# Patient Record
Sex: MALE | Race: WHITE | NOT HISPANIC OR LATINO | Employment: OTHER | URBAN - METROPOLITAN AREA
[De-identification: names, ages, dates, MRNs, and addresses within clinical notes are randomized per-mention and may not be internally consistent; named-entity substitution may affect disease eponyms.]

---

## 2020-06-30 ENCOUNTER — HOSPITAL ENCOUNTER (EMERGENCY)
Facility: HOSPITAL | Age: 55
Discharge: LEFT BEFORE DISCHARGE | End: 2020-06-30
Attending: EMERGENCY MEDICINE
Payer: MEDICARE

## 2020-06-30 VITALS
OXYGEN SATURATION: 97 % | DIASTOLIC BLOOD PRESSURE: 63 MMHG | TEMPERATURE: 98 F | SYSTOLIC BLOOD PRESSURE: 135 MMHG | BODY MASS INDEX: 22.45 KG/M2 | HEART RATE: 99 BPM | RESPIRATION RATE: 18 BRPM | HEIGHT: 67 IN | WEIGHT: 143.06 LBS

## 2020-06-30 DIAGNOSIS — R07.9 CHEST PAIN: ICD-10-CM

## 2020-06-30 DIAGNOSIS — E87.5 HYPERKALEMIA: ICD-10-CM

## 2020-06-30 DIAGNOSIS — M62.82 NON-TRAUMATIC RHABDOMYOLYSIS: ICD-10-CM

## 2020-06-30 DIAGNOSIS — N17.9 AKI (ACUTE KIDNEY INJURY): Primary | ICD-10-CM

## 2020-06-30 DIAGNOSIS — R53.83 FATIGUE: ICD-10-CM

## 2020-06-30 DIAGNOSIS — E86.0 DEHYDRATION: ICD-10-CM

## 2020-06-30 LAB
ALBUMIN SERPL BCP-MCNC: 4.7 G/DL (ref 3.5–5.2)
ALP SERPL-CCNC: 89 U/L (ref 55–135)
ALT SERPL W/O P-5'-P-CCNC: 91 U/L (ref 10–44)
ANION GAP SERPL CALC-SCNC: 17 MMOL/L (ref 8–16)
AST SERPL-CCNC: 57 U/L (ref 10–40)
BASOPHILS # BLD AUTO: 0.05 K/UL (ref 0–0.2)
BASOPHILS NFR BLD: 0.4 % (ref 0–1.9)
BILIRUB SERPL-MCNC: 0.5 MG/DL (ref 0.1–1)
BNP SERPL-MCNC: 16 PG/ML (ref 0–99)
BUN SERPL-MCNC: 28 MG/DL (ref 6–20)
CALCIUM SERPL-MCNC: 10.1 MG/DL (ref 8.7–10.5)
CHLORIDE SERPL-SCNC: 100 MMOL/L (ref 95–110)
CK SERPL-CCNC: 508 U/L (ref 20–200)
CO2 SERPL-SCNC: 20 MMOL/L (ref 23–29)
CREAT SERPL-MCNC: 3.3 MG/DL (ref 0.5–1.4)
DIFFERENTIAL METHOD: ABNORMAL
EOSINOPHIL # BLD AUTO: 0 K/UL (ref 0–0.5)
EOSINOPHIL NFR BLD: 0 % (ref 0–8)
ERYTHROCYTE [DISTWIDTH] IN BLOOD BY AUTOMATED COUNT: 12.5 % (ref 11.5–14.5)
EST. GFR  (AFRICAN AMERICAN): 23 ML/MIN/1.73 M^2
EST. GFR  (NON AFRICAN AMERICAN): 20 ML/MIN/1.73 M^2
ETHANOL SERPL-MCNC: <10 MG/DL
GLUCOSE SERPL-MCNC: 103 MG/DL (ref 70–110)
HCT VFR BLD AUTO: 43.8 % (ref 40–54)
HCV AB SERPL QL IA: POSITIVE
HGB BLD-MCNC: 14.9 G/DL (ref 14–18)
HIV 1+2 AB+HIV1 P24 AG SERPL QL IA: NEGATIVE
IMM GRANULOCYTES # BLD AUTO: 0.08 K/UL (ref 0–0.04)
IMM GRANULOCYTES NFR BLD AUTO: 0.6 % (ref 0–0.5)
LIPASE SERPL-CCNC: 22 U/L (ref 4–60)
LYMPHOCYTES # BLD AUTO: 2.5 K/UL (ref 1–4.8)
LYMPHOCYTES NFR BLD: 18.4 % (ref 18–48)
MCH RBC QN AUTO: 32.4 PG (ref 27–31)
MCHC RBC AUTO-ENTMCNC: 34 G/DL (ref 32–36)
MCV RBC AUTO: 95 FL (ref 82–98)
MONOCYTES # BLD AUTO: 1.3 K/UL (ref 0.3–1)
MONOCYTES NFR BLD: 9.6 % (ref 4–15)
NEUTROPHILS # BLD AUTO: 9.6 K/UL (ref 1.8–7.7)
NEUTROPHILS NFR BLD: 71 % (ref 38–73)
NRBC BLD-RTO: 0 /100 WBC
PLATELET # BLD AUTO: 357 K/UL (ref 150–350)
PMV BLD AUTO: 9.8 FL (ref 9.2–12.9)
POTASSIUM SERPL-SCNC: 6.1 MMOL/L (ref 3.5–5.1)
PROT SERPL-MCNC: 8 G/DL (ref 6–8.4)
RBC # BLD AUTO: 4.6 M/UL (ref 4.6–6.2)
SODIUM SERPL-SCNC: 137 MMOL/L (ref 136–145)
TROPONIN I SERPL DL<=0.01 NG/ML-MCNC: 0.01 NG/ML (ref 0–0.03)
WBC # BLD AUTO: 13.48 K/UL (ref 3.9–12.7)

## 2020-06-30 PROCEDURE — 83690 ASSAY OF LIPASE: CPT

## 2020-06-30 PROCEDURE — 86703 HIV-1/HIV-2 1 RESULT ANTBDY: CPT

## 2020-06-30 PROCEDURE — 86803 HEPATITIS C AB TEST: CPT

## 2020-06-30 PROCEDURE — 96374 THER/PROPH/DIAG INJ IV PUSH: CPT

## 2020-06-30 PROCEDURE — 82550 ASSAY OF CK (CPK): CPT

## 2020-06-30 PROCEDURE — 96361 HYDRATE IV INFUSION ADD-ON: CPT

## 2020-06-30 PROCEDURE — 93010 EKG 12-LEAD: ICD-10-PCS | Mod: ,,, | Performed by: INTERNAL MEDICINE

## 2020-06-30 PROCEDURE — 83880 ASSAY OF NATRIURETIC PEPTIDE: CPT

## 2020-06-30 PROCEDURE — 36415 COLL VENOUS BLD VENIPUNCTURE: CPT

## 2020-06-30 PROCEDURE — 25000003 PHARM REV CODE 250: Performed by: EMERGENCY MEDICINE

## 2020-06-30 PROCEDURE — 63600175 PHARM REV CODE 636 W HCPCS: Performed by: EMERGENCY MEDICINE

## 2020-06-30 PROCEDURE — 99291 CRITICAL CARE FIRST HOUR: CPT | Mod: 25

## 2020-06-30 PROCEDURE — 80320 DRUG SCREEN QUANTALCOHOLS: CPT

## 2020-06-30 PROCEDURE — 84484 ASSAY OF TROPONIN QUANT: CPT

## 2020-06-30 PROCEDURE — 93005 ELECTROCARDIOGRAM TRACING: CPT

## 2020-06-30 PROCEDURE — 80053 COMPREHEN METABOLIC PANEL: CPT

## 2020-06-30 PROCEDURE — 85025 COMPLETE CBC W/AUTO DIFF WBC: CPT

## 2020-06-30 PROCEDURE — 93010 ELECTROCARDIOGRAM REPORT: CPT | Mod: ,,, | Performed by: INTERNAL MEDICINE

## 2020-06-30 RX ORDER — ONDANSETRON 2 MG/ML
4 INJECTION INTRAMUSCULAR; INTRAVENOUS
Status: COMPLETED | OUTPATIENT
Start: 2020-06-30 | End: 2020-06-30

## 2020-06-30 RX ADMIN — SODIUM CHLORIDE 1000 ML: 0.9 INJECTION, SOLUTION INTRAVENOUS at 08:06

## 2020-06-30 RX ADMIN — ONDANSETRON 4 MG: 2 INJECTION INTRAMUSCULAR; INTRAVENOUS at 08:06

## 2020-06-30 NOTE — ED NOTES
Discussed with pt. Again importance of staying, pt. Refusing to stay, instructed to return if anything gets worse and follow up with PCP. Pt. Verbalized understanding.

## 2020-06-30 NOTE — ED NOTES
Dr. Cervantes at bedside discussing with pt. POC for admission, pt. Refusing to stay will sign out AMA.

## 2020-06-30 NOTE — ED PROVIDER NOTES
SCRIBE #1 NOTE: I, Pascual Loco, am scribing for, and in the presence of, Hayes Cervantes Jr., MD. I have scribed the entire note.       History     Chief Complaint   Patient presents with    Fatigue     with muscle cramps x 12 hours     Review of patient's allergies indicates:  No Known Allergies      History of Present Illness     HPI    6/30/2020, 8:00 AM  History obtained from the patient      History of Present Illness: El Evans is a 54 y.o. male patient who presents to the Emergency Department for evaluation of fatigue which onset gradually 12 hours PTA. Pt reports sweating outside and decreased PO intake. Symptoms are constant and moderate in severity. No mitigating or exacerbating factors reported. Associated sxs include BLE muscle cramps. Patient denies any fever, chills, HA, dizziness, SOB, CP, and all other sxs at this time. No prior Tx reported. No further complaints or concerns at this time.       Arrival mode: EMS    PCP: Primary Doctor No     Past Medical History:  History reviewed. No pertinent medical history.    Past Surgical History:  History reviewed. No pertinent surgical history.    Family History:  History reviewed. No pertinent family history.    Social History:  Social History Main Topics    Smoking status: Unknown if ever smoked    Smokeless tobacco: Unknown if ever used    Alcohol Use: Unknown drinking history    Drug Use: Unknown if ever used    Sexual Activity: Unknown      Review of Systems     Review of Systems   Constitutional: Positive for fatigue. Negative for chills and fever.   HENT: Negative for sore throat.    Respiratory: Negative for cough and shortness of breath.    Cardiovascular: Negative for chest pain and leg swelling.   Gastrointestinal: Negative for abdominal pain, diarrhea, nausea and vomiting.   Genitourinary: Negative for dysuria.   Musculoskeletal: Positive for myalgias (BLE muscle cramps). Negative for back pain, neck pain and neck stiffness.   Skin:  Negative for rash and wound.   Neurological: Negative for dizziness, weakness, light-headedness, numbness and headaches.   Hematological: Does not bruise/bleed easily.   All other systems reviewed and are negative.     Physical Exam     Initial Vitals   BP Pulse Resp Temp SpO2   06/30/20 0752 06/30/20 0752 06/30/20 0752 06/30/20 0752 06/30/20 0817   115/78 83 20 98.2 °F (36.8 °C) 99 %      MAP       --                 Physical Exam  Nursing Notes and Vital Signs Reviewed.  Constitutional: Patient is in no acute distress. Well-developed and well-nourished.  Head: Atraumatic. Normocephalic.  Eyes: PERRL. EOM intact. Conjunctivae are not pale. No scleral icterus.  ENT: Mucous membranes are dry. Oropharynx is clear and symmetric.    Neck: Supple. Full ROM.  Cardiovascular: Tachycardic. Regular rhythm. No murmurs, rubs, or gallops. Distal pulses are 2+ and symmetric.  Pulmonary/Chest: No respiratory distress. Clear to auscultation bilaterally. No wheezing or rales.  Abdominal: Soft and non-distended. There is no tenderness to palpation. No rebound or guarding.  Genitourinary: No CVA tenderness  Musculoskeletal: Moves all extremities. No obvious deformities. No edema. No calf tenderness.  Skin: Warm and dry. Poor skin turgor.  Neurological:  Alert, awake, and appropriate.  Normal speech.  No acute focal neurological deficits are appreciated.  Psychiatric: Normal affect. Good eye contact. Appropriate in content.     ED Course   Critical Care    Date/Time: 6/30/2020 11:17 AM  Performed by: Hayes Cervantes Jr., MD  Authorized by: Hayes Cervantes Jr., MD   Direct patient critical care time: 30 minutes  Additional history critical care time: 10 minutes  Ordering / reviewing critical care time: 25 minutes  Documentation critical care time: 10 minutes  Total critical care time (exclusive of procedural time) : 75 minutes  Critical care time was exclusive of separately billable procedures and treating other patients and teaching  "time.  Critical care was necessary to treat or prevent imminent or life-threatening deterioration of the following conditions: metabolic crisis and renal failure ((Hyperkalemia; XAVIER)).  Critical care was time spent personally by me on the following activities: blood draw for specimens, development of treatment plan with patient or surrogate, interpretation of cardiac output measurements, evaluation of patient's response to treatment, examination of patient, obtaining history from patient or surrogate, ordering and performing treatments and interventions, ordering and review of laboratory studies, ordering and review of radiographic studies, pulse oximetry, review of old charts and re-evaluation of patient's condition.        ED Vital Signs:  Vitals:    06/30/20 0752 06/30/20 0815 06/30/20 0817 06/30/20 0818   BP: 115/78  121/75    Pulse: 83 83 83    Resp: 20      Temp: 98.2 °F (36.8 °C)      TempSrc: Oral      SpO2:   99%    Weight:    64.9 kg (143 lb 1.3 oz)   Height: 5' 7" (1.702 m)       06/30/20 0924 06/30/20 0930 06/30/20 1047 06/30/20 1102   BP: 129/62  128/65 135/63   Pulse: 80 78 94 99   Resp: 19 18     Temp:       TempSrc:       SpO2: 99% 99% 97% 97%   Weight:       Height:           Abnormal Lab Results:  Labs Reviewed   HEPATITIS C ANTIBODY - Abnormal; Notable for the following components:       Result Value    Hepatitis C Ab Positive (*)     All other components within normal limits   CBC W/ AUTO DIFFERENTIAL - Abnormal; Notable for the following components:    WBC 13.48 (*)     Mean Corpuscular Hemoglobin 32.4 (*)     Platelets 357 (*)     Immature Granulocytes 0.6 (*)     Gran # (ANC) 9.6 (*)     Immature Grans (Abs) 0.08 (*)     Mono # 1.3 (*)     All other components within normal limits   COMPREHENSIVE METABOLIC PANEL - Abnormal; Notable for the following components:    Potassium 6.1 (*)     CO2 20 (*)     BUN, Bld 28 (*)     Creatinine 3.3 (*)     AST 57 (*)     ALT 91 (*)     Anion Gap 17 (*)     " eGFR if  23 (*)     eGFR if non  20 (*)     All other components within normal limits   CK - Abnormal; Notable for the following components:     (*)     All other components within normal limits   HIV 1 / 2 ANTIBODY   TROPONIN I   B-TYPE NATRIURETIC PEPTIDE   ALCOHOL,MEDICAL (ETHANOL)   LIPASE        All Lab Results:  Results for orders placed or performed during the hospital encounter of 06/30/20   HIV 1/2 Ag/Ab (4th Gen)   Result Value Ref Range    HIV 1/2 Ag/Ab Negative Negative   Hepatitis C antibody   Result Value Ref Range    Hepatitis C Ab Positive (A) Negative   CBC auto differential   Result Value Ref Range    WBC 13.48 (H) 3.90 - 12.70 K/uL    RBC 4.60 4.60 - 6.20 M/uL    Hemoglobin 14.9 14.0 - 18.0 g/dL    Hematocrit 43.8 40.0 - 54.0 %    Mean Corpuscular Volume 95 82 - 98 fL    Mean Corpuscular Hemoglobin 32.4 (H) 27.0 - 31.0 pg    Mean Corpuscular Hemoglobin Conc 34.0 32.0 - 36.0 g/dL    RDW 12.5 11.5 - 14.5 %    Platelets 357 (H) 150 - 350 K/uL    MPV 9.8 9.2 - 12.9 fL    Immature Granulocytes 0.6 (H) 0.0 - 0.5 %    Gran # (ANC) 9.6 (H) 1.8 - 7.7 K/uL    Immature Grans (Abs) 0.08 (H) 0.00 - 0.04 K/uL    Lymph # 2.5 1.0 - 4.8 K/uL    Mono # 1.3 (H) 0.3 - 1.0 K/uL    Eos # 0.0 0.0 - 0.5 K/uL    Baso # 0.05 0.00 - 0.20 K/uL    nRBC 0 0 /100 WBC    Gran% 71.0 38.0 - 73.0 %    Lymph% 18.4 18.0 - 48.0 %    Mono% 9.6 4.0 - 15.0 %    Eosinophil% 0.0 0.0 - 8.0 %    Basophil% 0.4 0.0 - 1.9 %    Differential Method Automated    Comprehensive metabolic panel   Result Value Ref Range    Sodium 137 136 - 145 mmol/L    Potassium 6.1 (H) 3.5 - 5.1 mmol/L    Chloride 100 95 - 110 mmol/L    CO2 20 (L) 23 - 29 mmol/L    Glucose 103 70 - 110 mg/dL    BUN, Bld 28 (H) 6 - 20 mg/dL    Creatinine 3.3 (H) 0.5 - 1.4 mg/dL    Calcium 10.1 8.7 - 10.5 mg/dL    Total Protein 8.0 6.0 - 8.4 g/dL    Albumin 4.7 3.5 - 5.2 g/dL    Total Bilirubin 0.5 0.1 - 1.0 mg/dL    Alkaline Phosphatase 89 55 -  135 U/L    AST 57 (H) 10 - 40 U/L    ALT 91 (H) 10 - 44 U/L    Anion Gap 17 (H) 8 - 16 mmol/L    eGFR if African American 23 (A) >60 mL/min/1.73 m^2    eGFR if non African American 20 (A) >60 mL/min/1.73 m^2   Troponin I #1   Result Value Ref Range    Troponin I 0.013 0.000 - 0.026 ng/mL   B-Type natriuretic peptide (BNP)   Result Value Ref Range    BNP 16 0 - 99 pg/mL   CK   Result Value Ref Range     (H) 20 - 200 U/L   Ethanol   Result Value Ref Range    Alcohol, Medical, Serum <10 <10 mg/dL   Lipase   Result Value Ref Range    Lipase 22 4 - 60 U/L       Imaging Results:  Imaging Results          X-Ray Chest AP Portable (Final result)  Result time 06/30/20 08:47:47    Final result by Bo Moore MD (06/30/20 08:47:47)                 Impression:      No acute radiographic abnormality in the chest.      Electronically signed by: Bo Moore  Date:    06/30/2020  Time:    08:47             Narrative:    EXAMINATION:  XR CHEST AP PORTABLE    CLINICAL HISTORY:  fatigue;    TECHNIQUE:  Single frontal view of the chest was performed.    COMPARISON:  None    FINDINGS:  Cardiac leads project over the chest.  Cardiomediastinal silhouette appears within normal limits.  Trachea is midline.  Lungs are symmetrically expanded.  No large consolidative opacity or effusion.  No pneumothorax.  Visualized osseous structures appear intact.                               X-Ray Abdomen Flat And Erect (Final result)  Result time 06/30/20 08:48:46    Final result by Bo Moore MD (06/30/20 08:48:46)                 Impression:      No acute radiographic abnormality in the abdomen.      Electronically signed by: Bo Moore  Date:    06/30/2020  Time:    08:48             Narrative:    EXAMINATION:  XR ABDOMEN FLAT AND ERECT    CLINICAL HISTORY:  Other fatigue    TECHNIQUE:  Flat and erect AP views of the abdomen were performed.    COMPARISON:  None    FINDINGS:  Cardiac leads project over the chest.  No gross  intraperitoneal free air.  Nonspecific bowel gas pattern.  No dilated loops of small bowel or colon to suggest obstruction or ileus.  Phleboliths in the left pelvis.  No pathologic calcification or mass in the abdomen.  No radiopaque renal calculi.  Visualized osseous structures appear intact.  Visualized lung bases appear clear.                                 The EKG was ordered, reviewed, and independently interpreted by the ED provider.  Interpretation time: 0802  Rate: 80 BPM  Rhythm: normal sinus rhythm  Interpretation: Possible left atrial enlargement. Right bundle branch block. Septal infarct, age undetermined. No STEMI.             The Emergency Provider reviewed the vital signs and test results, which are outlined above.     ED Discussion     11:16 AM: Pt is A&O x3, appropriate, and of capacity at this time. Pt voices desire to leave hospital. D/w pt in length need for further evaluation and treatment due to HPI and PEx. Pt declines any further evaluation or tx at this time. All risks, including worsening sx, permanent bodily harm and death, were discussed in length. Pt acknowledges all risk at this time and agrees to sign AMA form. Pt given RTER instructions. All questions and concerns addressed at this time. Pt leaving AMA.     The patient is over the age of 18 years, exhibits no evidence of an altered level of consciousness, and possesses appropriate decision-making capacity based on their ability to understand and communicate rationally.  Patient was advised, that, for an optimal recovery, they should be responsible for complying with the individualized treatment plan provided today by the medical professionals at [facility].  This includes taking medications as directed, reviewing and understanding all discharge instructions, and scheduling any appointments that were recommended.  I informed the patient that failing to take responsibility for their health and safety and not complying with the  recommendations provided to today is deemed to be against our medical advice. The patient was provided clear and verbal details regarding alternatives, benefits, risks, and complications related to their condition. In addition, we reiterated the seriousness of their condition and our recommendations for urgent follow-up care with a qualified healthcare provider capable of addressing their specific needs.  Furthermore, the patient was made aware of the serious complications that may be associated with their condition, including: stroke, permanent disability, paralysis, loss of limb(s), and death.  I have personally explained to the patient that choosing to leave against medical advise may result in permanent bodily harm or death. I again discussed at great length that without further evaluation and monitoring there may be unforeseen circumstances and/or deterioration causing permanent bodily harm or death as a result of his/her choice. The patient verbalized these risks back to me in laymans terms.  The patient was able to discuss the treatment that was recommended and, was aware of specific risks associated with the refusal of care relating to their specific condition.  Patient was instructed to return to the emergency department if their condition changes or they wish to comply with our treatment recommendations.          Medical Decision Making:   Clinical Tests:   Lab Tests: Ordered and Reviewed  Radiological Study: Ordered and Reviewed  Medical Tests: Ordered and Reviewed           ED Medication(s):  Medications   sodium chloride 0.9% bolus 1,000 mL (0 mLs Intravenous Stopped 6/30/20 1100)   ondansetron injection 4 mg (4 mg Intravenous Given 6/30/20 0845)       There are no discharge medications for this patient.      Follow-up Information     [facility]. Schedule an appointment as soon as possible for a visit in 1 week.    Contact information:  [address]  [address]  [phone]             [facility]. Schedule an appointment as soon as possible for a visit in 1 week.    Specialty: Internal Medicine  Contact information:  [address] [facility] [address] [phone]  Additional information:  2nd Floor - From [address], take the [facility] exit ([address] Enter the facility from the [address]                     Scribe Attestation:   Scribe #1: I performed the above scribed service and the documentation accurately describes the services I performed. I attest to the accuracy of the note.     Attending:   Physician Attestation Statement for Scribe #1: I, Hayes Cervantes Jr., MD, personally performed the services described in this documentation, as scribed by Pascual Loco, in my presence, and it is both accurate and complete.           Clinical Impression       ICD-10-CM ICD-9-CM   1. XAVIER (acute kidney injury)  N17.9 584.9   2. Chest pain  R07.9 786.50   3. Fatigue  R53.83 780.79   4. Hyperkalemia  E87.5 276.7   5. Dehydration  E86.0 276.51   6. Non-traumatic rhabdomyolysis  M62.82 728.88       Disposition:   Disposition: NELI Cervantes Jr., MD  06/30/20 1345

## 2020-06-30 NOTE — ED TRIAGE NOTES
"Pt. Arrives to ER complaining of generalized weakness, feeling SOB for last few days, states "I've been having these muslce cramps, one minute i'm fine then they're there." Some nausea denying any v/d, EMS gave aspiring and NS en eroute. Pt. Denying any CP currently  "

## 2020-06-30 NOTE — DISCHARGE INSTRUCTIONS
The patient is over the age of 18 years, exhibits no evidence of an altered level of consciousness, and possesses appropriate decision-making capacity based on their ability to understand and communicate rationally.  Patient was advised, that, for an optimal recovery, they should be responsible for complying with the individualized treatment plan provided today by the medical professionals at [facility].  This includes taking medications as directed, reviewing and understanding all discharge instructions, and scheduling any appointments that were recommended.  I informed the patient that failing to take responsibility for their health and safety and not complying with the recommendations provided to today is deemed to be against our medical advice. The patient was provided clear and verbal details regarding alternatives, benefits, risks, and complications related to their condition. In addition, we reiterated the seriousness of their condition and our recommendations for urgent follow-up care with a qualified healthcare provider capable of addressing their specific needs.  Furthermore, the patient was made aware of the serious complications that may be associated with their condition, including: stroke, permanent disability, paralysis, loss of limb(s), and death.  I have personally explained to the patient that choosing to leave against medical advise may result in permanent bodily harm or death. I again discussed at great length that without further evaluation and monitoring there may be unforeseen circumstances and/or deterioration causing permanent bodily harm or death as a result of his/her choice. The patient verbalized these risks back to me in laymans terms.  The patient was able to discuss the treatment that was recommended and, was aware of specific risks associated with the refusal of care relating to their specific condition.  Patient was instructed to return to the emergency department if their condition  changes or they wish to comply with our treatment recommendations.